# Patient Record
Sex: MALE | Race: WHITE
[De-identification: names, ages, dates, MRNs, and addresses within clinical notes are randomized per-mention and may not be internally consistent; named-entity substitution may affect disease eponyms.]

---

## 2017-10-31 ENCOUNTER — HOSPITAL ENCOUNTER (OUTPATIENT)
Dept: HOSPITAL 56 - MW.SDS | Age: 67
Discharge: HOME | End: 2017-10-31
Attending: SURGERY
Payer: OTHER GOVERNMENT

## 2017-10-31 DIAGNOSIS — Z85.038: ICD-10-CM

## 2017-10-31 DIAGNOSIS — N40.1: ICD-10-CM

## 2017-10-31 DIAGNOSIS — M19.90: ICD-10-CM

## 2017-10-31 DIAGNOSIS — Z98.890: ICD-10-CM

## 2017-10-31 DIAGNOSIS — R35.1: ICD-10-CM

## 2017-10-31 DIAGNOSIS — Z90.49: ICD-10-CM

## 2017-10-31 DIAGNOSIS — E78.00: ICD-10-CM

## 2017-10-31 DIAGNOSIS — Z87.891: ICD-10-CM

## 2017-10-31 DIAGNOSIS — Z86.19: ICD-10-CM

## 2017-10-31 DIAGNOSIS — Z45.2: Primary | ICD-10-CM

## 2017-10-31 DIAGNOSIS — Z79.899: ICD-10-CM

## 2017-10-31 DIAGNOSIS — E03.9: ICD-10-CM

## 2017-10-31 PROCEDURE — 36590 REMOVAL TUNNELED CV CATH: CPT

## 2017-10-31 NOTE — PCM.PREANE
Preanesthetic Assessment





- Anesthesia/Transfusion/Family Hx


Anesthesia History: Prior Anesthesia Without Reaction


Family History of Anesthesia Reaction: No


Transfusion History: No Prior Transfusion(s)





- Review of Systems


General: No Symptoms


Pulmonary: No Symptoms


Cardiovascular: No Symptoms


Gastrointestinal: No Symptoms


Neurological: No Symptoms


Other: Reports: None





- Physical Assessment


NPO Status Date: 10/30/17


Height: 1.88 m


Weight: 114.305 kg


ASA Class: 2


Mental Status: Alert & Oriented x3


Airway Class: Mallampati = 1


Dentition: Reports: Normal Dentition


ROM/Head Extension: Full


Lungs: Clear to Auscultation, Normal Respiratory Effort


Cardiovascular: Regular Rate, Regular Rhythm





- Allergies


Allergies/Adverse Reactions: 


 Allergies











Allergy/AdvReac Type Severity Reaction Status Date / Time


 


No Known Allergies Allergy   Verified 10/26/17 13:59














- Blood


Blood Available: No





- Anesthesia Plan


Pre-Op Medication Ordered: None





- Acknowledgements


Anesthesia Type Planned: MAC


Pt an Appropriate Candidate for the Planned Anesthesia: Yes


Alternatives and Risks of Anesthesia Discussed w Pt/Guardian: Yes


Pt/Guardian Understands and Agrees with Anesthesia Plan: Yes


Additional Comments: 





PLAN:  MAC with No benzodiazepines





PreAnesthesia Questionnaire


Other HEENT History: wears glasses


Cardiovascular History: Reports: High Cholesterol


Respiratory History: Reports: Other (See Below)


Other Respiratory History: was told he has some scaring in his lungs


Gastrointestinal History: Reports: Other (See Below)


Other Gastrointestinal History: hx of sigmoid colon cancer


Genitourinary History: Reports: BPH


Musculoskeletal History: Reports: Arthritis, Back Pain, Chronic


Endocrine/Metabolic History: Reports: Hypothyroidism


Oncologic (Cancer) History: Reports: Colon





- Past Surgical History


GI Surgical History: Reports: Colon, Colonoscopy


Other GI Surgeries/Procedures: hx of colon resection,  hx of exploratory 

Laparotomy


Oncologic Surgical History: Reports: Other (See Below)


Other Oncologic Surgeries/Procedures: Colon Resection





- SUBSTANCE USE


Smoking Status *Q: Former Smoker


Second Hand Smoke Exposure: Yes


Days Per Week of Alcohol Use: 0


Number of Drinks Per Day: 0


Total Drinks Per Week: 0


Recreational Drug Use History: Yes


Recreational Drug Last Use: quit marijuana in 1991





- HOME MEDS


Home Medications: 


 Home Meds





Ascorbic Acid 500 mg PO DAILY 10/26/17 [History]


Cholecalciferol (Vitamin D3) [Vitamin D3] 400 mg PO DAILY 10/26/17 [History]


Finasteride 5 mg PO DAILY 10/26/17 [History]


Fish Oil/Omega-3 Fatty Acids [Fish Oil 1,000 MG] 2,000 mg PO BID 10/26/17 [

History]


Levothyroxine Sodium [Synthroid] 125 mcg PO DAILY 10/26/17 [History]


Meloxicam 15 mg PO DAILY 10/26/17 [History]


Simvastatin [Zocor] 10 mg PO BEDTIME 10/26/17 [History]


Testosterone Cypionate 150 mg IM ASDIRECTED 10/26/17 [History]


Ubidecarenone [Co Q-10] 100 mg PO DAILY 10/26/17 [History]











- CURRENT (IN HOUSE) MEDS


Current Meds: 





 Current Medications





Lactated Ringer's (Ringers, Lactated)  1,000 mls @ 125 mls/hr IV ASDIRECTED RADHA





Discontinued Medications





Cefazolin Sodium/Dextrose 2 gm (/ Premix)  50 mls @ 100 mls/hr IV ONETIME ONE


   Stop: 10/31/17 05:29

## 2017-10-31 NOTE — PCM.OPNOTE
- General Post-Op/Procedure Note


Date of Surgery/Procedure: 10/31/17


Operative Procedure(s): portacath removal, L chest


Findings: 


portacath L removed, tip is intact; [id number]


Pre Op Diagnosis: portacath removal, L


Post-Op Diagnosis: Same


Anesthesia Technique: MAC, Moderate Sedation


Primary Surgeon: Tito Rosa


Pathology: 





sent


Complications: None


Condition: Good

## 2017-10-31 NOTE — OR
SURGEON:

Tito Rosa MD

 

DATE OF PROCEDURE:  10/31/2017

 

PREOPERATIVE DIAGNOSIS:

Port-A-Cath removal.

 

POSTOPERATIVE DIAGNOSIS:

Port-A-Cath removal.

 

PROCEDURE PERFORMED:

Removal of Port-A-Cath from the left chest.

 

COMPLICATIONS:

None.

 

FINDINGS:

Port-A-Cath was removed en bloc and the tip was intact.

 

PROCEDURE IN DETAIL:

The patient was taken to the operating room and placed in a supine position.

Upon induction of mild general sedation with Diprivan and the patient's left

chest was prepped and draped in a sterile fashion.  Lidocaine infiltrated at the

previous port incision site and then using a skin scalpel, incision was made,

which entered into the fibrous pocket of the port and the port was removed en

bloc, and the pressure was applied to the jugular and tunneled site for over 5

minutes.  Port site was clean, dry, and intact upon closing with 3-0 Vicryl and

3-0 Ethilon followed with appropriate dressing.  The patient was then awakened

and transferred to recovery room in hemodynamically stable condition.  The

patient tolerated the procedure well.  There were no intraoperative

complications.

 

 

TATE / KELY

DD:  10/31/2017 09:51:25

DT:  10/31/2017 10:30:06

Job #:  [id number]

## 2018-02-20 ENCOUNTER — HOSPITAL ENCOUNTER (EMERGENCY)
Dept: HOSPITAL 56 - MW.ED | Age: 68
Discharge: OTHER INSTITUTION | End: 2018-02-20
Payer: OTHER GOVERNMENT

## 2018-02-20 DIAGNOSIS — E03.9: ICD-10-CM

## 2018-02-20 DIAGNOSIS — K43.6: Primary | ICD-10-CM

## 2018-02-20 DIAGNOSIS — E78.00: ICD-10-CM

## 2018-02-20 DIAGNOSIS — Z79.899: ICD-10-CM

## 2018-02-20 DIAGNOSIS — Z87.891: ICD-10-CM

## 2018-02-20 LAB
CHLORIDE SERPL-SCNC: 107 MMOL/L (ref 98–110)
SODIUM SERPL-SCNC: 139 MMOL/L (ref 136–146)

## 2018-02-20 PROCEDURE — 96376 TX/PRO/DX INJ SAME DRUG ADON: CPT

## 2018-02-20 PROCEDURE — 96374 THER/PROPH/DIAG INJ IV PUSH: CPT

## 2018-02-20 PROCEDURE — 36415 COLL VENOUS BLD VENIPUNCTURE: CPT

## 2018-02-20 PROCEDURE — 81001 URINALYSIS AUTO W/SCOPE: CPT

## 2018-02-20 PROCEDURE — 80053 COMPREHEN METABOLIC PANEL: CPT

## 2018-02-20 PROCEDURE — 84484 ASSAY OF TROPONIN QUANT: CPT

## 2018-02-20 PROCEDURE — 71045 X-RAY EXAM CHEST 1 VIEW: CPT

## 2018-02-20 PROCEDURE — 74177 CT ABD & PELVIS W/CONTRAST: CPT

## 2018-02-20 PROCEDURE — 96361 HYDRATE IV INFUSION ADD-ON: CPT

## 2018-02-20 PROCEDURE — 85025 COMPLETE CBC W/AUTO DIFF WBC: CPT

## 2018-02-20 PROCEDURE — 96375 TX/PRO/DX INJ NEW DRUG ADDON: CPT

## 2018-02-20 PROCEDURE — 93005 ELECTROCARDIOGRAM TRACING: CPT

## 2018-02-20 PROCEDURE — 99285 EMERGENCY DEPT VISIT HI MDM: CPT

## 2018-02-20 PROCEDURE — 43753 TX GASTRO INTUB W/ASP: CPT

## 2018-02-20 PROCEDURE — 83690 ASSAY OF LIPASE: CPT

## 2018-02-20 NOTE — CR
EXAMINATION: Portable chest radiograph.

 

HISTORY: Shortness of breath.

 

FINDINGS: 

The trachea is midline. The cardiomediastinal silhouette is within normal limits. No pulmonary infilt
rates, effusions or pneumothorax. Mild interstitial prominence.

 

Osseous structures appear unremarkable.

 

IMPRESSION: 

No acute cardiopulmonary process.

## 2018-02-20 NOTE — EDM.PDOC
ED HPI GENERAL MEDICAL PROBLEM





- General


Chief Complaint: Abdominal Pain


Stated Complaint: [facility]


Time Seen by Provider: 02/20/18 13:08


Source of Information: Reports: Patient





- History of Present Illness


INITIAL COMMENTS - FREE TEXT/NARRATIVE: 





HISTORY AND PHYSICAL:


History of present illness:


[





[facility], has a history of a 

ventral abdominal surgery he states is secondary to hernia however I see on his 

records he has a history of bowel perforation, which would  explanain his large 

midline incision.





[facility] and there is evidence 

of bowel herniation





He was sent here for lab and CT of his abdomen pelvis, he does complain of 4 

out of 10 pain he is in no distress he has had some vomiting over the last day 

or 2





He has been nothing by mouth over the last 24 hours per patient





Currently no fever vomiting chills sweats no chest pain shortness of breath 

headache dizziness palpitation about a urine symptoms





Cardiac history








Review of systems: 


As per history of present illness and below otherwise all systems reviewed and 

negative.


Past medical history: 


As per history of present illness and as reviewed below otherwise 

noncontributory.


Surgical history: 


As per history of present illness and as reviewed below otherwise 

noncontributory.


Social history: 


No reported history of drug or alcohol abuse.


Family history: 


As per history of present illness and as reviewed below otherwise 

noncontributory.








Physical exam:


HEENT: Atraumatic, normocephalic, pupils reactive, negative for conjunctival 

pallor or scleral icterus, mucous membranes moist, throat clear, neck supple, 

nontender, trachea midline.


Lungs: Clear to auscultation, breath sounds equal bilaterally, chest nontender.


Heart: S1S2, regular, negative for clicks, rubs, or JVD.


Abdomen: Soft, nondistended, nontender. Negative for masses or 

hepatosplenomegaly. Negative for costovertebral tenderness.


Pelvis: Stable nontender.


Genitourinary: Deferred.


Rectal: Deferred.


Extremities: Atraumatic, negative for cords or calf pain. Neurovascular 

unremarkable.


Neuro: Awake, alert, oriented. Cranial nerves II through XII unremarkable. 

Cerebellum unremarkable. Motor and sensory unremarkable throughout. Exam 

nonfocal.








Diagnostics:


[CBC CMP lipase troponin


UA


CT abdomen pelvis with contrast





]


Therapeutics:


[Normal saline 500 mL bolus


Zofran 8 mg IV


Toradol 30 mg IV


Morphine 2 mg IV


NG tube placement


Nothing by mouth





Discussed with Dr. Meléndez he did initially except the patient


Patient in the interim had refused treatment here at the facility requesting 

treatment at [place]


Shortly after this Dr. Meléndez and return calls stating that we may not have 

appropriate mesh after reviewing the case and would likely need a larger 

facility





Dr. Zhao general surgeon on-call at [facility]  gracious in 

accepting the patient through [facility] for evaluation and treatment


]


Impression: 


[Abdominal pain


Ventral small bowel herniation with obstruction


Leukocytosis





]


Definitive disposition and diagnosis as appropriate pending reevaluation and 

review of above.


  ** abdomen


Pain Score (Numeric/FACES): 8





- Related Data


 Allergies











Allergy/AdvReac Type Severity Reaction Status Date / Time


 


No Known Allergies Allergy   Verified 02/20/18 13:03











Home Meds: 


 Home Meds





Ascorbic Acid 500 mg PO DAILY 10/26/17 [History]


Cholecalciferol (Vitamin D3) [Vitamin D3] 400 mg PO DAILY 10/26/17 [History]


Finasteride 5 mg PO DAILY 10/26/17 [History]


Fish Oil/Omega-3 Fatty Acids [Fish Oil 1,000 MG] 2,000 mg PO BID 10/26/17 [

History]


Levothyroxine Sodium [Synthroid] 125 mcg PO DAILY 10/26/17 [History]


Meloxicam 15 mg PO DAILY 10/26/17 [History]


Simvastatin [Zocor] 10 mg PO BEDTIME 10/26/17 [History]











Past Medical History


Other HEENT History: wears glasses


Cardiovascular History: Reports: High Cholesterol


Respiratory History: Reports: Other (See Below)


Other Respiratory History: was told he has some scaring in his lungs


Gastrointestinal History: Reports: Other (See Below)


Other Gastrointestinal History: hx of sigmoid colon cancer


Genitourinary History: Reports: BPH


Musculoskeletal History: Reports: Arthritis, Back Pain, Chronic


Endocrine/Metabolic History: Reports: Hypothyroidism


Oncologic (Cancer) History: Reports: Colon





- Past Surgical History


GI Surgical History: Reports: Colon, Colonoscopy


Other GI Surgeries/Procedures: hx of colon resection,  hx of exploratory 

Laparotomy


Oncologic Surgical History: Reports: Other (See Below)


Other Oncologic Surgeries/Procedures: Colon Resection





Social & Family History





- Tobacco Use


Smoking Status *Q: Former Smoker


Second Hand Smoke Exposure: Yes





- Alcohol Use


Days Per Week of Alcohol Use: 0


Number of Drinks Per Day: 0


Total Drinks Per Week: 0





- Recreational Drug Use


Recreational Drug Use: Yes


Drug Use in Last 12 Months: No


Recreational Drug Last Use: quit marijuana in 1991





ED ROS GENERAL





- Review of Systems


Review Of Systems: ROS reveals no pertinent complaints other than HPI.





ED EXAM, GENERAL





- Physical Exam


Exam: See Below





Course





- Vital Signs


Last Recorded V/S: 


 Last Vital Signs











Temp  98.9 F   02/20/18 14:15


 


Pulse  98   02/20/18 14:15


 


Resp  18   02/20/18 14:15


 


BP  135/74   02/20/18 14:15


 


Pulse Ox  92 L  02/20/18 14:15














- Orders/Labs/Meds


Orders: 


 Active Orders 24 hr











 Category Date Time Status


 


 EKG Documentation Completion [RC] STAT Care  02/20/18 13:07 Active


 


 Chest 1V Frontal [CR] Stat Exams  02/20/18 15:09 Ordered


 


 Sodium Chloride 0.9% [Normal Saline] 500 ml Med  02/20/18 13:15 Active





 IV STAT   








 Medication Orders





Sodium Chloride (Normal Saline)  500 mls @ 999 mls/hr IV STAT RADHA


   Last Admin: 02/20/18 13:24  Dose: 999 mls/hr








Labs: 


 Laboratory Tests











  02/20/18 02/20/18 02/20/18 Range/Units





  13:07 13:24 13:24 


 


WBC   15.48 H   (4.0-11.0)  K/uL


 


RBC   5.73   (4.50-5.90)  M/uL


 


Hgb   18.3 H   (13.0-17.0)  g/dL


 


Hct   53.1 H   (38.0-50.0)  %


 


MCV   92.7   (80.0-98.0)  fL


 


MCH   31.9   (27.0-32.0)  pg


 


MCHC   34.5   (31.0-37.0)  g/dL


 


RDW Std Deviation   47.6   (28.0-62.0)  fl


 


RDW Coeff of Michelle   14   (11.0-15.0)  %


 


Plt Count   235   (150-400)  K/uL


 


MPV   10.60   (7.40-12.00)  fL


 


Neut % (Auto)   87.8 H   (48.0-80.0)  %


 


Lymph % (Auto)   6.8 L   (16.0-40.0)  %


 


Mono % (Auto)   5.2   (0.0-15.0)  %


 


Eos % (Auto)   0.1   (0.0-7.0)  %


 


Baso % (Auto)   0.1   (0.0-1.5)  %


 


Neut # (Auto)   13.6 H   (1.4-5.7)  K/uL


 


Lymph # (Auto)   1.1   (0.6-2.4)  K/uL


 


Mono # (Auto)   0.8   (0.0-0.8)  K/uL


 


Eos # (Auto)   0.0   (0.0-0.7)  K/uL


 


Baso # (Auto)   0.0   (0.0-0.1)  K/uL


 


Nucleated RBC %   0.0   /100WBC


 


Nucleated RBCs #   0   K/uL


 


Sodium    139  (136-146)  mmol/L


 


Potassium    4.2  (3.5-5.1)  mmol/L


 


Chloride    107  ()  mmol/L


 


Carbon Dioxide    22  (21-31)  mmol/L


 


BUN    22  (6.0-23.0)  mg/dL


 


Creatinine    1.1  (0.6-1.5)  mg/dL


 


Est Cr Clr Drug Dosing    77.84  mL/min


 


Estimated GFR (MDRD)    > 60.0  ml/min


 


Glucose    163 H  ()  mg/dL


 


Calcium    8.9  (8.8-10.8)  mg/dL


 


Total Bilirubin    0.9  (0.1-1.5)  mg/dL


 


AST    27  (5-40)  IU/L


 


ALT    42  (8-54)  IU/L


 


Alkaline Phosphatase    79  ()  


 


Troponin I    < 0.10  (0.0-0.29)  NG/ML


 


Total Protein    7.9  (6.0-8.0)  g/dL


 


Albumin    4.4  (3.4-4.8)  g/dL


 


Globulin    3.5  (2.0-3.5)  g/dL


 


Albumin/Globulin Ratio    1.3  (1.3-2.8)  


 


Lipase    20  (7-80)  U/L


 


Urine Color  YELLOW    


 


Urine Appearance  CLEAR    


 


Urine pH  6.0    (5.0-8.0)  


 


Ur Specific Gravity  >= 1.030    (1.001-1.035)  


 


Urine Protein  100    (NEGATIVE)  mg/dL


 


Urine Glucose (UA)  NEGATIVE    (NEGATIVE)  mg/dL


 


Urine Ketones  NEGATIVE    (NEGATIVE)  mg/dL


 


Urine Occult Blood  TRACE-INTACT    (NEGATIVE)  


 


Urine Nitrite  NEGATIVE    (NEGATIVE)  


 


Urine Bilirubin  NEGATIVE    (NEGATIVE)  


 


Urine Urobilinogen  0.2    (<2.0)  EU/dL


 


Ur Leukocyte Esterase  NEGATIVE    (NEGATIVE)  


 


Urine RBC  0-2    (0-2/HPF)  


 


Urine WBC  0-1    (0-5/HPF)  


 


Ur Epithelial Cells  OCCASIONAL    (NONE-FEW)  


 


Urine Bacteria  RARE    (NEGATIVE)  


 


Urine Mucus  LIGHT    (NONE-MOD)  











Meds: 


Medications











Generic Name Dose Route Start Last Admin





  Trade Name Freq  PRN Reason Stop Dose Admin


 


Sodium Chloride  500 mls @ 999 mls/hr  02/20/18 13:15  02/20/18 13:24





  Normal Saline  IV   999 mls/hr





  STAT RADHA   Administration














Discontinued Medications














Generic Name Dose Route Start Last Admin





  Trade Name Freq  PRN Reason Stop Dose Admin


 


Iopamidol  100 ml  02/20/18 14:21  02/20/18 14:23





  Isovue Multipack-370 (76%)  IVPUSH  02/20/18 14:22  100 ml





  ONETIME STA   Administration


 


Ketorolac Tromethamine  30 mg  02/20/18 13:07  02/20/18 13:24





  Toradol  IVPUSH  02/20/18 13:08  30 mg





  ONETIME ONE   Administration


 


Morphine Sulfate  2 mg  02/20/18 15:08  02/20/18 15:19





  Morphine  IVPUSH  02/20/18 15:09  2 mg





  ONETIME ONE   Administration


 


Ondansetron HCl  8 mg  02/20/18 13:07  02/20/18 13:24





  Zofran  IVPUSH  02/20/18 13:08  8 mg





  ONETIME ONE   Administration














Departure





- Departure


Time of Disposition: 15:25


Disposition: DC/Tfer to Other 70


Condition: Fair


Clinical Impression: 


 Ventral hernia with bowel obstruction








- Discharge Information


Referrals: 


PCP,None [Primary Care Provider] - 


Forms:  ED Department Discharge





- My Orders


Last 24 Hours: 


My Active Orders





02/20/18 13:07


EKG Documentation Completion [RC] STAT 





02/20/18 13:15


Sodium Chloride 0.9% [Normal Saline] 500 ml IV STAT 





02/20/18 15:09


Chest 1V Frontal [CR] Stat 














- Assessment/Plan


Last 24 Hours: 


My Active Orders





02/20/18 13:07


EKG Documentation Completion [RC] STAT 





02/20/18 13:15


Sodium Chloride 0.9% [Normal Saline] 500 ml IV STAT 





02/20/18 15:09


Chest 1V Frontal [CR] Stat

## 2019-02-08 ENCOUNTER — HOSPITAL ENCOUNTER (EMERGENCY)
Dept: HOSPITAL 56 - MW.ED | Age: 69
Discharge: NURSING, REHAB OR LONG-TERM CARE | End: 2019-02-08
Payer: OTHER GOVERNMENT

## 2019-02-08 DIAGNOSIS — E78.00: ICD-10-CM

## 2019-02-08 DIAGNOSIS — K43.6: Primary | ICD-10-CM

## 2019-02-08 DIAGNOSIS — E03.9: ICD-10-CM

## 2019-02-08 DIAGNOSIS — Z79.899: ICD-10-CM

## 2019-02-08 LAB
CHLORIDE SERPL-SCNC: 103 MMOL/L (ref 98–107)
SODIUM SERPL-SCNC: 137 MMOL/L (ref 136–148)

## 2019-02-08 PROCEDURE — 80053 COMPREHEN METABOLIC PANEL: CPT

## 2019-02-08 PROCEDURE — 96375 TX/PRO/DX INJ NEW DRUG ADDON: CPT

## 2019-02-08 PROCEDURE — 96376 TX/PRO/DX INJ SAME DRUG ADON: CPT

## 2019-02-08 PROCEDURE — 99285 EMERGENCY DEPT VISIT HI MDM: CPT

## 2019-02-08 PROCEDURE — 96361 HYDRATE IV INFUSION ADD-ON: CPT

## 2019-02-08 PROCEDURE — 85025 COMPLETE CBC W/AUTO DIFF WBC: CPT

## 2019-02-08 PROCEDURE — 83605 ASSAY OF LACTIC ACID: CPT

## 2019-02-08 PROCEDURE — 74176 CT ABD & PELVIS W/O CONTRAST: CPT

## 2019-02-08 PROCEDURE — 74018 RADEX ABDOMEN 1 VIEW: CPT

## 2019-02-08 PROCEDURE — 43753 TX GASTRO INTUB W/ASP: CPT

## 2019-02-08 PROCEDURE — 36415 COLL VENOUS BLD VENIPUNCTURE: CPT

## 2019-02-08 PROCEDURE — 51702 INSERT TEMP BLADDER CATH: CPT

## 2019-02-08 PROCEDURE — 96365 THER/PROPH/DIAG IV INF INIT: CPT

## 2019-02-08 NOTE — EDM.PDOC
ED HPI GENERAL MEDICAL PROBLEM





- General


Chief Complaint: Abdominal Pain


Stated Complaint: ABD PAIN


Time Seen by Provider: 02/08/19 10:39


Source of Information: Reports: Patient


History Limitations: Reports: No Limitations





- History of Present Illness


INITIAL COMMENTS - FREE TEXT/NARRATIVE: 


History of present illness:


Patient has a history of ventral hernia secondary to a perforated colon during 

a colonoscopy requiring an exploratory laparotomy in 2014 that has been 

repaired 5 times. He is currently  followed by Dr. Dooley at the [facility]. He is scheduled for surgery for another repair of this ventral 

hernia after he loses 20 pounds. Last night patient was working out and he felt 

a sudden sharp pain in his abdomen and has had continuing pain through the 

night. His belly feels swollen and he states his "bowels are out". Patient 

denies any fevers, chills, vomiting and states he is passing flatus. He is 

nauseated.





Review of systems: 


As per history of present illness and below otherwise all systems reviewed and 

negative.





Past medical history: 


As per history of present illness and as reviewed below otherwise 

noncontributory.





Surgical history: 


As per history of present illness and as reviewed below otherwise 

noncontributory.





Social history: 


No reported history of drug or alcohol abuse.





Family history: 


As per history of present illness and as reviewed below otherwise 

noncontributory.





Physical exam:


General: Well developed, well nourished in NAD


HEENT: Atraumatic, normocephalic, pupils reactive, negative for conjunctival 

pallor or scleral icterus, mucous membranes moist, throat clear, neck supple, 

nontender, trachea midline.


Lungs: Clear to auscultation, breath sounds equal bilaterally, chest nontender.


Heart: S1S2, regular, negative for clicks, rubs, or JVD.


Abdomen: Midline scar well-healed, hypoactive bowel sounds but present , Soft, 

distended, tenderness of his lower abdomen without rebound or guarding. 

Negative for masses or hepatosplenomegaly. Negative for costovertebral 

tenderness.


Pelvis: Stable nontender.


Genitourinary: Deferred.


Rectal: Deferred.


Extremities: Atraumatic, negative for cords or calf pain. Neurovascular 

unremarkable.


Neuro: Awake, alert, oriented. Cranial nerves II through XII unremarkable. 

Cerebellum unremarkable. Motor and sensory unremarkable throughout. Exam 

nonfocal.


Skin:warm and dry





Diagnostics:


CBC, chemistry, lactate, CT abdomen pelvis





Therapeutics:


IV fluid, Dilaudid, Zofran, Unasyn, NG, Ramachandran





ED Course:


Patient remained stable while in the ED. CT showed closed loop bowel 

obstruction within the hernia Dr[facility] 

[facility] and were evaluated by him. Except at the patient for transfer





Impression: 


Hernia with closed loop bowel obstruction within the hernia





Prescriptions:


None





Plan:


Transfer to [facility] surgery for definitive treatment





Definitive disposition and diagnosis as appropriate pending reevaluation and 

review of above.





  ** Abdomen


Pain Score (Numeric/FACES): 10





- Related Data


 Allergies











Allergy/AdvReac Type Severity Reaction Status Date / Time


 


pollen extracts Allergy  Other Verified 02/08/19 10:56











Home Meds: 


 Home Meds





Ascorbic Acid 500 mg PO DAILY 10/26/17 [History]


Cholecalciferol (Vitamin D3) [Vitamin D3] 400 mg PO DAILY 10/26/17 [History]


Finasteride 5 mg PO DAILY 10/26/17 [History]


Fish Oil/Omega-3 Fatty Acids [Fish Oil 1,000 MG] 2,000 mg PO BID 10/26/17 [

History]


Levothyroxine Sodium [Synthroid] 125 mcg PO DAILY 10/26/17 [History]


Meloxicam 15 mg PO DAILY 10/26/17 [History]


Cholestyramine/Aspartame [Prevalite Packet] 4 gm PO BID 02/08/19 [History]











Past Medical History


HEENT History: Reports: Other (See Below)


Other HEENT History: wears glasses


Cardiovascular History: Reports: High Cholesterol


Respiratory History: Reports: Other (See Below)


Other Respiratory History: was told he has some scaring in his lungs


Gastrointestinal History: Reports: Other (See Below)


Other Gastrointestinal History: hx of sigmoid colon cancer


Genitourinary History: Reports: BPH


Musculoskeletal History: Reports: Arthritis, Back Pain, Chronic


Neurological History: Reports: None


Psychiatric History: Reports: None


Endocrine/Metabolic History: Reports: Hypothyroidism


Oncologic (Cancer) History: Reports: Colon


Dermatologic History: Reports: None





- Past Surgical History


GI Surgical History: Reports: Colon, Colonoscopy


Other GI Surgeries/Procedures: hx of colon resection,  hx of exploratory 

Laparotomy


Oncologic Surgical History: Reports: Other (See Below)


Other Oncologic Surgeries/Procedures: Colon Resection





Social & Family History





- Family History


Family Medical History: Noncontributory





- Caffeine Use


Caffeine Use: Reports: None





ED ROS GENERAL





- Review of Systems


Review Of Systems: ROS reveals no pertinent complaints other than HPI.





ED EXAM, GI/ABD





- Physical Exam


Exam: See Below (See history of present illness)





Course





- Vital Signs


Last Recorded V/S: 


 Last Vital Signs











Temp  97.2 F   02/08/19 10:52


 


Pulse  77   02/08/19 13:04


 


Resp  16   02/08/19 13:04


 


BP  165/98 H  02/08/19 13:04


 


Pulse Ox  96   02/08/19 13:04














- Orders/Labs/Meds


Orders: 


 Active Orders 24 hr











 Category Date Time Status


 


 Urinary Catheter Assessment [RC] ASDIRECTED Care  02/08/19 13:49 Active


 


 Urinary Catheter Insertion [Insert Urinary Catheter] [ Care  02/08/19 13:48 

Ordered





 OM.PC] Stat   


 


 Nasogastric Orogastric Tube Insertion [OM.PC] Stat Oth  02/08/19 13:48 Ordered


 


 Nasogastric Orogastric Tube Insertion [OM.PC] Stat Oth  02/08/19 14:50 Ordered


 


 Saline Lock Insert [OM.PC] Stat Oth  02/08/19 11:06 Ordered











Labs: 


 Laboratory Tests











  02/08/19 02/08/19 02/08/19 Range/Units





  11:25 11:25 11:25 


 


WBC  12.99 H    (4.0-11.0)  K/uL


 


RBC  5.34    (4.50-5.90)  M/uL


 


Hgb  17.0    (13.0-17.0)  g/dL


 


Hct  48.7    (38.0-50.0)  %


 


MCV  91.2    (80.0-98.0)  fL


 


MCH  31.8    (27.0-32.0)  pg


 


MCHC  34.9    (31.0-37.0)  g/dL


 


RDW Std Deviation  42.9    (28.0-62.0)  fl


 


RDW Coeff of Michelle  13    (11.0-15.0)  %


 


Plt Count  218    (150-400)  K/uL


 


MPV  10.80    (7.40-12.00)  fL


 


Neut % (Auto)  87.0 H    (48.0-80.0)  %


 


Lymph % (Auto)  7.4 L    (16.0-40.0)  %


 


Mono % (Auto)  5.4    (0.0-15.0)  %


 


Eos % (Auto)  0.1    (0.0-7.0)  %


 


Baso % (Auto)  0.1    (0.0-1.5)  %


 


Neut # (Auto)  11.3 H    (1.4-5.7)  K/uL


 


Lymph # (Auto)  1.0    (0.6-2.4)  K/uL


 


Mono # (Auto)  0.7    (0.0-0.8)  K/uL


 


Eos # (Auto)  0.0    (0.0-0.7)  K/uL


 


Baso # (Auto)  0.0    (0.0-0.1)  K/uL


 


Nucleated RBC %  0.0    /100WBC


 


Nucleated RBCs #  0    K/uL


 


Lactate    2.3 H  (0.20-2.00)  mmol/L


 


Sodium   137   (136-148)  mmol/L


 


Potassium   4.6   (3.5-5.1)  mmol/L


 


Chloride   103   ()  mmol/L


 


Carbon Dioxide   24.3   (21.0-32.0)  mmol/L


 


BUN   16   (7.0-18.0)  mg/dL


 


Creatinine   1.1   (0.8-1.3)  mg/dL


 


Est Cr Clr Drug Dosing   76.80   mL/min


 


Estimated GFR (MDRD)   > 60.0   ml/min


 


Glucose   155 H   ()  mg/dL


 


Calcium   9.7   (8.5-10.1)  mg/dL


 


Total Bilirubin   0.5   (0.2-1.0)  mg/dL


 


AST   132 H   (15-37)  IU/L


 


ALT   181 H   (14-63)  IU/L


 


Alkaline Phosphatase   127 H   ()  U/L


 


Total Protein   8.0   (6.4-8.2)  g/dL


 


Albumin   3.8   (3.4-5.0)  g/dL


 


Globulin   4.2 H   (2.6-4.0)  g/dL


 


Albumin/Globulin Ratio   0.9   (0.9-1.6)  











Meds: 


Medications














Discontinued Medications














Generic Name Dose Route Start Last Admin





  Trade Name Freq  PRN Reason Stop Dose Admin


 


Hydromorphone HCl  0.5 mg  02/08/19 11:07  02/08/19 11:36





  Dilaudid  IVPUSH  02/08/19 11:08  0.5 mg





  ONETIME ONE   Administration





     





     





     





     


 


Hydromorphone HCl  0.5 mg  02/08/19 13:50  02/08/19 14:11





  Dilaudid  IVPUSH  02/08/19 13:51  0.5 mg





  ONETIME ONE   Administration





     





     





     





     


 


Hydromorphone HCl  Confirm  02/08/19 13:52  02/08/19 14:13





  Dilaudid  Administered  02/08/19 13:53  Not Given





  Dose   





  1 mg   





  .ROUTE   





  .STK-MED ONE   





     





     





     





     


 


Sodium Chloride  1,000 mls @ 999 mls/hr  02/08/19 11:07  02/08/19 11:30





  Normal Saline  IV  02/08/19 12:07  999 mls/hr





  .Bolus ONE   Administration





     





     





     





     


 


Ampicillin Sodium/Sulbactam  100 mls @ 200 mls/hr  02/08/19 13:46  02/08/19 14:

18





  Sodium 3 gm/ Sodium Chloride  IV  02/08/19 14:15  Not Given





  ONETIME ONE   





     





     





     





     


 


Sodium Chloride  1,000 mls @ 999 mls/hr  02/08/19 13:46  02/08/19 13:50





  Normal Saline  IV  02/08/19 14:46  999 mls/hr





  .Bolus ONE   Administration





     





     





     





     


 


Ampicillin Sodium/Sulbactam  100 mls @ 200 mls/hr  02/08/19 13:54  02/08/19 14:

14





  Sodium 3 gm/ Sodium Chloride  IV  02/08/19 14:15  200 mls/hr





  ONETIME ONE   Administration





     





     





     





     


 


Ondansetron HCl  4 mg  02/08/19 11:07  02/08/19 11:34





  Zofran  IVPUSH  02/08/19 11:08  4 mg





  ONETIME ONE   Administration





     





     





     





     


 


Sodium Chloride  10 ml  02/08/19 11:06  





  Saline Flush  FLUSH   





  ASDIRECTED PRN   





  Keep Vein Open   





     





     





     


 


Sodium Chloride  2.5 ml  02/08/19 11:06  





  Saline Flush  FLUSH   





  ASDIRECTED PRN   





  Keep Vein Open   





     





     





     














Departure





- Departure


Time of Disposition: 19:19


Disposition: [facility] to [facility]


Condition: Fair


Clinical Impression: 


 Ventral hernia with bowel obstruction








- Discharge Information


*PRESCRIPTION DRUG MONITORING PROGRAM REVIEWED*: No


*COPY OF PRESCRIPTION DRUG MONITORING REPORT IN PATIENT ABBEY: No


Referrals: 


Van Mcgrath MD [Primary Care Provider] - 


Forms:  ED Department Discharge





- My Orders


Last 24 Hours: 


My Active Orders





02/08/19 11:06


Saline Lock Insert [OM.PC] Stat 





02/08/19 13:48


Urinary Catheter Insertion [Insert Urinary Catheter] [OM.PC] Stat 


Nasogastric Orogastric Tube Insertion [OM.PC] Stat 





02/08/19 13:49


Urinary Catheter Assessment [RC] ASDIRECTED 





02/08/19 14:50


Nasogastric Orogastric Tube Insertion [OM.PC] Stat 














- Assessment/Plan


Last 24 Hours: 


My Active Orders





02/08/19 11:06


Saline Lock Insert [OM.PC] Stat 





02/08/19 13:48


Urinary Catheter Insertion [Insert Urinary Catheter] [OM.PC] Stat 


Nasogastric Orogastric Tube Insertion [OM.PC] Stat 





02/08/19 13:49


Urinary Catheter Assessment [RC] ASDIRECTED 





02/08/19 14:50


Nasogastric Orogastric Tube Insertion [OM.PC] Stat

## 2019-02-08 NOTE — CT
CT of the abdomen and pelvis without contrast.

 

HISTORY: Pain

 

TECHNIQUE: Axial CT images were obtained of the abdomen and pelvis without

contrast. Coronal and sagittal reconstructions obtained.

 

FINDINGS: 

The lung bases are clear, no pleural effusion.

 

There is mild to moderate fatty infiltration of the liver. Cholelithiasis

without evidence of cholecystitis. Spleen, adrenal glands, and pancreas appear

unremarkable for noncontrast examination.  There is no bulky retroperitoneal

lymphadenopathy. No abdominal ascites.

 

Nonobstructing right nephrolithiasis.

 

There is a midline infraumbilical hernia containing a portion of bowel. There

are several loops of dilated small bowel with a moderate amount of free fluid

noted within the hernia itself. This is likely a closed loop. Appendectomy.

Anastomosis changes noted near the rectosigmoid junction. Mild diverticulosis

without evidence of diverticulitis. Moderate prosthetic prominence. Small

fat-containing left inguinal hernia. Urinary bladder is otherwise normal. There

are a few tiny more superior midline hernias containing fat.

 

The visualized osseous structures appear normal.

 

IMPRESSION: 

 

1. Likely closed loop small bowel obstruction secondary to herniation through a

for umbilical abdominal hernia. There is moderate adjacent free fluid and early

stranding.

2. Cholelithiasis without evidence cholecystitis.

3. Moderate fatty infiltration of liver.

4. Mild diverticulosis without evidence of diverticulitis.

5. Tiny fat-containing left inguinal hernia.

6. Mild/moderate prostatomegaly.

## 2019-02-08 NOTE — CR
EXAMINATION: Abdomen

 

HISTORY: NG tube placement

 

COMPARISON: CT from the same day

 

TECHNIQUE: AP views of the abdomen

 

FINDINGS: There is a partially visualized NG tube with the tip near the

gastroesophageal junction. Multiple dilated loops of small bowel again noted

measuring up to 4.7 cm. Osseous structures appear normal. No organomegaly.

 

IMPRESSION: 

1. NG tube noted with tip near the gastroesophageal junction, this could be

advanced at least 10 cm to allow the side-port to pass into the stomach.

2. Dilated loops of small bowel again noted consistent with a bowel obstruction.

## 2024-08-21 NOTE — PCM.POSTAN
POST ANESTHESIA ASSESSMENT





- MENTAL STATUS


Mental Status: Alert, Oriented





- RESPIRATORY


Respiratory Status: Respiratory Rate WNL, Airway Patent, O2 Saturation Stable





- CARDIOVASCULAR


CV Status: Pulse Rate WNL, Blood Pressure Stable





- GASTROINTESTINAL


GI Status: No Symptoms





- POST OP HYDRATION


Hydration Status: Adequate & Stable [Well Nourished] : well nourished [Well Developed] : well developed [Well-Appearing] : well-appearing [Normal Sclera/Conjunctiva] : normal sclera/conjunctiva [PERRL] : pupils equal round and reactive to light [EOMI] : extraocular movements intact [Normal Outer Ear/Nose] : the outer ears and nose were normal in appearance [Normal Oropharynx] : the oropharynx was normal [No JVD] : no jugular venous distention [No Lymphadenopathy] : no lymphadenopathy [Supple] : supple [Thyroid Normal, No Nodules] : the thyroid was normal and there were no nodules present [No Respiratory Distress] : no respiratory distress  [No Accessory Muscle Use] : no accessory muscle use [Clear to Auscultation] : lungs were clear to auscultation bilaterally [Normal Rate] : normal rate  [Regular Rhythm] : with a regular rhythm [Normal S1, S2] : normal S1 and S2 [No Murmur] : no murmur heard [No Carotid Bruits] : no carotid bruits [No Abdominal Bruit] : a ~M bruit was not heard ~T in the abdomen [No Varicosities] : no varicosities [Pedal Pulses Present] : the pedal pulses are present [No Edema] : there was no peripheral edema [No Palpable Aorta] : no palpable aorta [No Extremity Clubbing/Cyanosis] : no extremity clubbing/cyanosis [Normal Appearance] : normal in appearance [No Axillary Lymphadenopathy] : no axillary lymphadenopathy [Soft] : abdomen soft [Non Tender] : non-tender [Non-distended] : non-distended [No Masses] : no abdominal mass palpated [No HSM] : no HSM [Normal Bowel Sounds] : normal bowel sounds [Normal Anterior Cervical Nodes] : no anterior cervical lymphadenopathy [Normal Posterior Cervical Nodes] : no posterior cervical lymphadenopathy [No CVA Tenderness] : no CVA  tenderness [No Spinal Tenderness] : no spinal tenderness [No Joint Swelling] : no joint swelling [Grossly Normal Strength/Tone] : grossly normal strength/tone [No Rash] : no rash [Coordination Grossly Intact] : coordination grossly intact [No Focal Deficits] : no focal deficits [Normal Gait] : normal gait [Deep Tendon Reflexes (DTR)] : deep tendon reflexes were 2+ and symmetric [Normal Affect] : the affect was normal [Normal Insight/Judgement] : insight and judgment were intact

## 2024-10-22 ENCOUNTER — HOSPITAL ENCOUNTER (EMERGENCY)
Dept: HOSPITAL 56 - MW.ED | Age: 74
Discharge: HOME | End: 2024-10-22
Payer: OTHER GOVERNMENT
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
## 2024-10-22 DIAGNOSIS — Z79.899: ICD-10-CM

## 2024-10-22 DIAGNOSIS — Z91.048: ICD-10-CM

## 2024-10-22 DIAGNOSIS — E03.9: ICD-10-CM

## 2024-10-22 DIAGNOSIS — S05.01XA: Primary | ICD-10-CM

## 2024-10-22 RX ADMIN — TETRACAINE HYDROCHLORIDE STA ML: 5 SOLUTION OPHTHALMIC at 13:57
